# Patient Record
Sex: FEMALE | Race: WHITE
[De-identification: names, ages, dates, MRNs, and addresses within clinical notes are randomized per-mention and may not be internally consistent; named-entity substitution may affect disease eponyms.]

---

## 2017-05-22 ENCOUNTER — HOSPITAL ENCOUNTER (OUTPATIENT)
Dept: HOSPITAL 53 - M SMT | Age: 32
End: 2017-05-22
Attending: ADVANCED PRACTICE MIDWIFE
Payer: COMMERCIAL

## 2017-05-22 DIAGNOSIS — Z13.89: Primary | ICD-10-CM

## 2017-05-22 LAB
T3RU NFR SERPL: 32 % (ref 30–39)
T4 SERPL-MCNC: 7.3 UG/DL (ref 4.5–12)

## 2017-10-17 ENCOUNTER — HOSPITAL ENCOUNTER (OUTPATIENT)
Dept: HOSPITAL 53 - M LAB REF | Age: 32
End: 2017-10-17
Attending: PHYSICIAN ASSISTANT
Payer: COMMERCIAL

## 2017-10-17 DIAGNOSIS — J02.9: Primary | ICD-10-CM

## 2017-10-19 ENCOUNTER — HOSPITAL ENCOUNTER (EMERGENCY)
Dept: HOSPITAL 53 - M ED | Age: 32
Discharge: HOME | End: 2017-10-19
Payer: COMMERCIAL

## 2017-10-19 VITALS — BODY MASS INDEX: 29.1 KG/M2 | HEIGHT: 67 IN | WEIGHT: 185.41 LBS

## 2017-10-19 VITALS — SYSTOLIC BLOOD PRESSURE: 107 MMHG | DIASTOLIC BLOOD PRESSURE: 56 MMHG

## 2017-10-19 DIAGNOSIS — Z91.018: ICD-10-CM

## 2017-10-19 DIAGNOSIS — Z88.8: ICD-10-CM

## 2017-10-19 DIAGNOSIS — J06.9: ICD-10-CM

## 2017-10-19 DIAGNOSIS — Z79.3: ICD-10-CM

## 2017-10-19 DIAGNOSIS — Z79.2: ICD-10-CM

## 2017-10-19 DIAGNOSIS — Z91.040: ICD-10-CM

## 2017-10-19 DIAGNOSIS — R55: Primary | ICD-10-CM

## 2017-10-19 DIAGNOSIS — Z79.899: ICD-10-CM

## 2017-10-19 LAB
ANION GAP SERPL CALC-SCNC: 6 MEQ/L (ref 8–16)
BUN SERPL-MCNC: 11 MG/DL (ref 7–18)
CALCIUM SERPL-MCNC: 8.5 MG/DL (ref 8.5–10.1)
CHLORIDE SERPL-SCNC: 110 MEQ/L (ref 98–107)
CO2 SERPL-SCNC: 22 MEQ/L (ref 21–32)
CONTROL LINE HCG: (no result)
CREAT SERPL-MCNC: 0.8 MG/DL (ref 0.55–1.02)
ERYTHROCYTE [DISTWIDTH] IN BLOOD BY AUTOMATED COUNT: 12.5 % (ref 11.5–14.5)
GFR SERPL CREATININE-BSD FRML MDRD: > 60 ML/MIN/{1.73_M2} (ref 60–?)
GLUCOSE SERPL-MCNC: 85 MG/DL (ref 70–105)
MCH RBC QN AUTO: 30 PG (ref 27–33)
MCHC RBC AUTO-ENTMCNC: 32.9 G/DL (ref 32–36.5)
MCV RBC AUTO: 90.9 FL (ref 80–96)
NRBC BLD AUTO-RTO: 0 % (ref 0–0)
PLATELET # BLD AUTO: 248 10^3/UL (ref 150–450)
POTASSIUM SERPL-SCNC: 4.5 MEQ/L (ref 3.5–5.1)
SODIUM SERPL-SCNC: 138 MEQ/L (ref 136–145)
WBC # BLD AUTO: 6.8 10^3/UL (ref 4–10)

## 2017-10-19 NOTE — ECGEPIP
Stationary ECG Study

                           [facility] - ED

                                       

                                       Test Date:    2017-10-19

Pat Name:     MARI AMOS        Department:   

Patient ID:   [id number]                 Room:         -

Gender:       F                        Technician:   mallory

:          1985               Requested By: Yareli Rubin 

Order Number: [id number]     Reading MD:   Yareli Rubin

                                 Measurements

Intervals                              Axis          

Rate:         56                       P:            13

KS:           190                      QRS:          20

QRSD:         84                       T:            14

QT:           416                                    

QTc:          404                                    

                           Interpretive Statements

SINUS BRADYCARDIA

DECREASED RATE 3/9/16

Electronically Signed On 10- 8:20:57 EDT by Yareli Rubin

## 2017-10-19 NOTE — REP
CT Head without contrast

 

HISTORY:  Trauma

 

COMPARISON: None

 

There is no intraparenchymal hemorrhage, acute infarct,  mass or midline shift.

The ventricular system is normal in appearance.  There is no extra cerebral

collection.  There is no fracture.  The visualized sinuses are clear.

 

IMPRESSION:  There is no intracranial lesion.

 

 

 

 

Signed by

Juan Carlos Mullins MD 10/19/2017 09:38 A

## 2017-10-19 NOTE — REP
Right lower extremity deep vein duplex ultrasound:

 

The deep veins demonstrate normal compression, normal Doppler color flow and

normal Doppler waveforms with respiration augmentation at multiple levels from

the popliteal vein to the common femoral vein.

 

Impression:

 

There is no right lower extremity deep vein thrombus.

 

 

Signed by

Julio C Dao MD 10/19/2017 08:39 A

## 2019-08-10 ENCOUNTER — HOSPITAL ENCOUNTER (EMERGENCY)
Dept: HOSPITAL 53 - M ED | Age: 34
Discharge: HOME | End: 2019-08-10
Payer: COMMERCIAL

## 2019-08-10 VITALS — DIASTOLIC BLOOD PRESSURE: 69 MMHG | SYSTOLIC BLOOD PRESSURE: 111 MMHG

## 2019-08-10 DIAGNOSIS — Z79.899: ICD-10-CM

## 2019-08-10 DIAGNOSIS — Z79.3: ICD-10-CM

## 2019-08-10 DIAGNOSIS — K90.0: ICD-10-CM
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
## 2019-08-10 DIAGNOSIS — Z91.040: ICD-10-CM

## 2019-08-10 DIAGNOSIS — S60.862A: Primary | ICD-10-CM

## 2019-08-10 DIAGNOSIS — Z88.8: ICD-10-CM

## 2019-08-10 DIAGNOSIS — Z91.018: ICD-10-CM
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
## 2019-08-10 PROCEDURE — 99285 EMERGENCY DEPT VISIT HI MDM: CPT

## 2019-08-10 PROCEDURE — 94760 N-INVAS EAR/PLS OXIMETRY 1: CPT

## 2019-08-10 PROCEDURE — 93041 RHYTHM ECG TRACING: CPT

## 2019-08-10 PROCEDURE — 96361 HYDRATE IV INFUSION ADD-ON: CPT

## 2019-08-10 PROCEDURE — 96375 TX/PRO/DX INJ NEW DRUG ADDON: CPT

## 2019-08-10 PROCEDURE — 96374 THER/PROPH/DIAG INJ IV PUSH: CPT

## 2020-12-08 ENCOUNTER — HOSPITAL ENCOUNTER (OUTPATIENT)
Dept: HOSPITAL 53 - M SFHCWAGY | Age: 35
End: 2020-12-08
Attending: ADVANCED PRACTICE MIDWIFE
Payer: COMMERCIAL

## 2020-12-08 DIAGNOSIS — R87.610: ICD-10-CM

## 2020-12-08 DIAGNOSIS — Z12.4: Primary | ICD-10-CM

## 2020-12-08 PROCEDURE — 87624 HPV HI-RISK TYP POOLED RSLT: CPT

## 2021-02-01 ENCOUNTER — HOSPITAL ENCOUNTER (OUTPATIENT)
Dept: HOSPITAL 53 - M ADAMS | Age: 36
End: 2021-02-01
Attending: NURSE PRACTITIONER
Payer: COMMERCIAL

## 2021-02-01 DIAGNOSIS — M79.644: Primary | ICD-10-CM

## 2021-02-02 NOTE — REP
INDICATION:

PAIN IN RIGHT FINGER



COMPARISON:

None.



TECHNIQUE:

AP, lateral, bilateral oblique views right 1st digit.



FINDINGS:

The osseous structures and joint spaces are intact and normal.  There is no evidence

for acute fracture or dislocation.  Surrounding soft tissues are unremarkable.  No

subcutaneous emphysema or radiodense foreign body.



IMPRESSION:

Essentially unremarkable age-appropriate examination.  No acute fracture or

dislocation.





<Electronically signed by Orlando Hull > 02/02/21 0624